# Patient Record
Sex: MALE
[De-identification: names, ages, dates, MRNs, and addresses within clinical notes are randomized per-mention and may not be internally consistent; named-entity substitution may affect disease eponyms.]

---

## 2020-12-15 ENCOUNTER — HOSPITAL ENCOUNTER (INPATIENT)
Dept: HOSPITAL 92 - ERS | Age: 35
LOS: 2 days | Discharge: HOME | DRG: 854 | End: 2020-12-17
Attending: INTERNAL MEDICINE | Admitting: FAMILY MEDICINE
Payer: SELF-PAY

## 2020-12-15 VITALS — BODY MASS INDEX: 23 KG/M2

## 2020-12-15 DIAGNOSIS — E11.628: ICD-10-CM

## 2020-12-15 DIAGNOSIS — E87.1: ICD-10-CM

## 2020-12-15 DIAGNOSIS — E11.65: ICD-10-CM

## 2020-12-15 DIAGNOSIS — L03.115: ICD-10-CM

## 2020-12-15 DIAGNOSIS — Z20.828: ICD-10-CM

## 2020-12-15 DIAGNOSIS — A41.9: Primary | ICD-10-CM

## 2020-12-15 LAB
ALBUMIN SERPL BCG-MCNC: 4 G/DL (ref 3.5–5)
ALP SERPL-CCNC: 206 U/L (ref 40–110)
ALT SERPL W P-5'-P-CCNC: 35 U/L (ref 8–55)
ANION GAP SERPL CALC-SCNC: 11 MMOL/L (ref 10–20)
ANION GAP SERPL CALC-SCNC: 17 MMOL/L (ref 10–20)
AST SERPL-CCNC: 26 U/L (ref 5–34)
BASOPHILS # BLD AUTO: 0.1 THOU/UL (ref 0–0.2)
BASOPHILS NFR BLD AUTO: 0.4 % (ref 0–1)
BICARBONATE (HCO3V): 21.7 MMOL/L (ref 22–28)
BILIRUB SERPL-MCNC: 0.3 MG/DL (ref 0.2–1.2)
BUN SERPL-MCNC: 25 MG/DL (ref 8.9–20.6)
BUN SERPL-MCNC: 30 MG/DL (ref 8.9–20.6)
CA-I BLD-SCNC: 0.94 MMOL/L (ref 1.15–1.33)
CALCIUM SERPL-MCNC: 8 MG/DL (ref 7.8–10.44)
CALCIUM SERPL-MCNC: 9.3 MG/DL (ref 7.8–10.44)
CHLORIDE SERPL-SCNC: 103 MMOL/L (ref 98–107)
CHLORIDE SERPL-SCNC: 94 MMOL/L (ref 98–107)
CHLORIDE SERPL-SCNC: 98 MMOL/L (ref 98–107)
CO2 BLDV CALC-SCNC: 22.7 MMOL/L (ref 22–28)
CO2 SERPL-SCNC: 22 MMOL/L (ref 22–29)
CO2 SERPL-SCNC: 23 MMOL/L (ref 22–29)
CO2 TENSION (PVCO2): 33.9 MMHG (ref 40–50)
CREAT CL PREDICTED SERPL C-G-VRATE: 0 ML/MIN (ref 70–130)
CREAT CL PREDICTED SERPL C-G-VRATE: 0 ML/MIN (ref 70–130)
DRUG SCREEN CUTOFF: (no result)
EOSINOPHIL # BLD AUTO: 0.1 THOU/UL (ref 0–0.7)
EOSINOPHIL NFR BLD AUTO: 0.7 % (ref 0–10)
GLOBULIN SER CALC-MCNC: 4.5 G/DL (ref 2.4–3.5)
GLUCOSE SERPL-MCNC: 414 MG/DL (ref 70–105)
GLUCOSE SERPL-MCNC: 647 MG/DL (ref 70–105)
HCT VFR BLD CALC: 46 % (ref 42–52)
HEMOGLOBIN - CALC: 15.5 G/DL (ref 14–18)
HGB BLD-MCNC: 14.9 G/DL (ref 14–18)
LYMPHOCYTES # BLD: 1.9 THOU/UL (ref 1.2–3.4)
LYMPHOCYTES NFR BLD AUTO: 15.5 % (ref 21–51)
MCH RBC QN AUTO: 28.1 PG (ref 27–31)
MCV RBC AUTO: 84.1 FL (ref 78–98)
MEDTOX CONTROL LINE VALID?: (no result)
MEDTOX READER #: (no result)
MONOCYTES # BLD AUTO: 0.7 THOU/UL (ref 0.11–0.59)
MONOCYTES NFR BLD AUTO: 5.8 % (ref 0–10)
NEUTROPHILS # BLD AUTO: 9.7 THOU/UL (ref 1.4–6.5)
NEUTROPHILS NFR BLD AUTO: 77.6 % (ref 42–75)
PLATELET # BLD AUTO: 294 THOU/UL (ref 130–400)
POTASSIUM SERPL-SCNC: 4.1 MMOL/L (ref 3.5–5.1)
POTASSIUM SERPL-SCNC: 4.5 MMOL/L (ref 3.5–5.1)
POTASSIUM SERPL-SCNC: 5.1 MMOL/L (ref 3.5–5.1)
RBC # BLD AUTO: 5.3 MILL/UL (ref 4.7–6.1)
RBC UR QL AUTO: (no result) HPF (ref 0–3)
SAO2 % BLDV FROM PO2: 84.2 % (ref 60–85)
SODIUM SERPL-SCNC: 129 MMOL/L (ref 136–145)
SODIUM SERPL-SCNC: 130 MMOL/L (ref 138–145)
SODIUM SERPL-SCNC: 132 MMOL/L (ref 136–145)
SP GR UR STRIP: 1.02 (ref 1–1.04)
WBC # BLD AUTO: 12.5 THOU/UL (ref 4.8–10.8)
WBC UR QL AUTO: (no result) HPF (ref 0–3)

## 2020-12-15 PROCEDURE — 81001 URINALYSIS AUTO W/SCOPE: CPT

## 2020-12-15 PROCEDURE — U0003 INFECTIOUS AGENT DETECTION BY NUCLEIC ACID (DNA OR RNA); SEVERE ACUTE RESPIRATORY SYNDROME CORONAVIRUS 2 (SARS-COV-2) (CORONAVIRUS DISEASE [COVID-19]), AMPLIFIED PROBE TECHNIQUE, MAKING USE OF HIGH THROUGHPUT TECHNOLOGIES AS DESCRIBED BY CMS-2020-01-R: HCPCS

## 2020-12-15 PROCEDURE — 83930 ASSAY OF BLOOD OSMOLALITY: CPT

## 2020-12-15 PROCEDURE — 36416 COLLJ CAPILLARY BLOOD SPEC: CPT

## 2020-12-15 PROCEDURE — 80048 BASIC METABOLIC PNL TOTAL CA: CPT

## 2020-12-15 PROCEDURE — 80306 DRUG TEST PRSMV INSTRMNT: CPT

## 2020-12-15 PROCEDURE — 96365 THER/PROPH/DIAG IV INF INIT: CPT

## 2020-12-15 PROCEDURE — 82010 KETONE BODYS QUAN: CPT

## 2020-12-15 PROCEDURE — 82330 ASSAY OF CALCIUM: CPT

## 2020-12-15 PROCEDURE — 87635 SARS-COV-2 COVID-19 AMP PRB: CPT

## 2020-12-15 PROCEDURE — 85025 COMPLETE CBC W/AUTO DIFF WBC: CPT

## 2020-12-15 PROCEDURE — G0378 HOSPITAL OBSERVATION PER HR: HCPCS

## 2020-12-15 PROCEDURE — 80053 COMPREHEN METABOLIC PANEL: CPT

## 2020-12-15 PROCEDURE — 36415 COLL VENOUS BLD VENIPUNCTURE: CPT

## 2020-12-15 PROCEDURE — 83036 HEMOGLOBIN GLYCOSYLATED A1C: CPT

## 2020-12-15 PROCEDURE — 85652 RBC SED RATE AUTOMATED: CPT

## 2020-12-15 PROCEDURE — 87070 CULTURE OTHR SPECIMN AEROBIC: CPT

## 2020-12-15 PROCEDURE — 96366 THER/PROPH/DIAG IV INF ADDON: CPT

## 2020-12-15 PROCEDURE — 10060 I&D ABSCESS SIMPLE/SINGLE: CPT

## 2020-12-15 PROCEDURE — 80202 ASSAY OF VANCOMYCIN: CPT

## 2020-12-15 PROCEDURE — 83605 ASSAY OF LACTIC ACID: CPT

## 2020-12-15 PROCEDURE — 87186 SC STD MICRODIL/AGAR DIL: CPT

## 2020-12-15 PROCEDURE — 96367 TX/PROPH/DG ADDL SEQ IV INF: CPT

## 2020-12-15 PROCEDURE — 87040 BLOOD CULTURE FOR BACTERIA: CPT

## 2020-12-15 PROCEDURE — 87205 SMEAR GRAM STAIN: CPT

## 2020-12-15 PROCEDURE — 87077 CULTURE AEROBIC IDENTIFY: CPT

## 2020-12-15 PROCEDURE — 96376 TX/PRO/DX INJ SAME DRUG ADON: CPT

## 2020-12-15 PROCEDURE — 82803 BLOOD GASES ANY COMBINATION: CPT

## 2020-12-15 PROCEDURE — 86140 C-REACTIVE PROTEIN: CPT

## 2020-12-15 NOTE — RAD
EXAM: 3 views of the right foot



HISTORY: Fourth digit wound. Diabetic patient



COMPARISON: None



FINDINGS: 3 views of the right foot shows no evidence of acute fracture or dislocation. Mild fourth t
oe soft tissue swelling is seen. No osseous erosions are seen. No degenerative changes are present.



IMPRESSION: No evidence of acute osseous abnormality.



Reported By: Ariel Polanco 

Electronically Signed:  12/15/2020 2:00 PM

## 2020-12-15 NOTE — PDOC.HHP
Hospitalist HPI





- History of Present Illness


Right foot redness


History of Present Illness: 





PCP: None





The patient is a 35-year-old male with a past medical history significant for 

diabetes type 2 on insulin that presents to the emergency department for the 

above complaint.  The patient reports increasing redness and swelling to the 

dorsal aspect of his right foot and fourth toe for the past 5 days.  He went to 

a clinic and was prescribed oral doxycycline and Flagyl, along with triple 

antibiotic ointment.  He reports he has been compliant with his regimen for the 

past 5 days.  However, the redness and swelling spread up the dorsal aspect of 

his right foot.  He reports mild, intermittent pain with ambulation, described 

as aching and throbbing.  He denies any known trauma/injury. Denies history IV 

drug usage. He denies any fever or chills.  He has no history of PAD and is 

immunocompetent.  Denies any history of DVT/PE.  Denies abdominal pain, nausea, 

vomiting, diarrhea.  For these reasons, the patient came to the emergency 

department for further evaluation.


ED Course: 





VITAL SIGNS Tue Dec 15, 2020 13:13 CARMEN Thornton, [facility] 


BP: 162/90, Pulse: 98, Resp: 16, Temp: 98.1 (Oral), Pain: 0, O2 sat: 99 on (Room

Air), Time: 12/15/2020 13:13. 


VITAL SIGNS Tue Dec 15, 2020 14:46 Natalia Darby 


BP: 123/67, Pulse: 95, O2 sat: 99 on (Room Air), Time: 12/15/2020 14:46. 


VITAL SIGNS Tue Dec 15, 2020 16:00 Natalia Darby 


BP: 121/72, Pulse: 90, Resp: 16, O2 sat: 99 on (Room Air), Time: 12/15/2020 

16:00.





Medications:


sodium chloride 0.9 % intravenous 2 L IV Fluid Infusion Acknowledged 18:00 

12/15/2020 


NovoLIN R Regular U-100 Insuln 7 units Subcutaneous Acknowledged 17:59 

12/15/2020 


vancomycin intravenous 1 g IV Piggy Back Given 17:37 12/15/2020 


cefepime injection 1 g IV Push Given 16:55 12/15/2020





Hospitalist ROS





- Review of Systems


Constitutional: denies: fever, chills


Respiratory: denies: shortness of breath, hemoptysis, SOB with excertion


Cardiovascular: denies: chest pain, palpitations, light headedness


Gastrointestinal: denies: nausea, vomiting, abdominal pain, diarrhea, 

constipation


Genitourinary: denies: dysuria, hematuria





Hospitalist History





- Past Medical History


Source: patient


Cardiac: reports: no pertinent history


Endocrine: reports: Diabetes





- Past Surgical History


Past Surgical History: reports: no pertinent history





- Family History


Other Family History: 





Noncontributory to this case





- Social History


Smoking Status: Never smoker


Alcohol: reports: None


Living Situation: With Family


Activity level: independent ambulation





- Exam


General Appearance: NAD, awake alert.  negative: ill appearing


Eye: anicteric sclera


ENT: normocephalic atraumatic


Heart: RRR, no murmur, no gallops, no rubs, normal peripheral pulses


Respiratory: CTAB, no wheezes, no rales, no ronchi, normal chest expansion, no 

tachypnea


Gastrointestinal: soft, non-tender, non-distended, normal bowel sounds, no 

guarding, no rigidity


Gastrointestinal - other findings: No rebound


Extremities: no cyanosis.  negative: no edema (Right foot has erythema around 

the fourth and fifth digit extending up the dorsal aspect to the midfoot, 

lateral aspect right fourth digit status post I&D with packing, palpable pedal 

pulse, full range of motion, sensation intact.  Erythema borders marked.  No 

pain outside of erythematous border.)


Neurological: no focal deficits


Musculoskeletal: normal tone, normal strength


Psychiatric: normal affect, A&O x 3





Hospitalist Results





- Labs


Result Diagrams: 


                                 12/15/20 14:04





                                 12/15/20 14:04


Lab results: 


                                        











WBC  12.5 thou/uL (4.8-10.8)  H  12/15/20  14:04    


 


Hgb  14.9 g/dL (14.0-18.0)   12/15/20  14:04    


 


Hct  44.6 % (42.0-52.0)   12/15/20  14:04    


 


MCV  84.1 fL (78.0-98.0)   12/15/20  14:04    


 


Plt Count  294 thou/uL (130-400)   12/15/20  14:04    


 


Neutrophils %  77.6 % (42.0-75.0)  H  12/15/20  14:04    


 


ESR Westergren  56 mm/hr (Less than 15)  H  12/15/20  14:04    


 


Sodium  129 mmol/L (136-145)  L  12/15/20  14:04    


 


Potassium  5.1 mmol/L (3.5-5.1)   12/15/20  14:04    


 


Chloride  94 mmol/L ()  L  12/15/20  14:04    


 


Carbon Dioxide  23 mmol/L (22-29)   12/15/20  14:04    


 


BUN  30 mg/dL (8.9-20.6)  H  12/15/20  14:04    


 


Creatinine  2.04 mg/dL (0.7-1.3)  H  12/15/20  14:04    


 


Glucose  647 mg/dL ()  H*  12/15/20  14:04    


 


Lactic Acid  2.8 mmol/L (0.5-2.2)  H  12/15/20  16:56    


 


Calcium  9.3 mg/dL (7.8-10.44)   12/15/20  14:04    


 


Total Bilirubin  0.3 mg/dL (0.2-1.2)   12/15/20  14:04    


 


AST  26 U/L (5-34)   12/15/20  14:04    


 


ALT  35 U/L (8-55)   12/15/20  14:04    


 


Alkaline Phosphatase  206 U/L ()  H  12/15/20  14:04    


 


C-Reactive Protein  6.72 mg/dL (= or < 0.5)  H  12/15/20  14:04    


 


Serum Total Protein  8.5 g/dL (6.0-8.3)  H  12/15/20  14:04    


 


Albumin  4.0 g/dL (3.5-5.0)   12/15/20  14:04    














- Radiology Interpretation


  ** Other


Status: report reviewed by me


Additional Comment: 





Xray Right foot





FINDINGS: 3 views of the right foot shows no evidence of acute fracture or 

dislocation. Mild fourth toe soft tissue swelling is seen. No osseous erosions 

are seen. No degenerative changes are present.





IMPRESSION: No evidence of acute osseous abnormality. 





Hospitalist H&P A/P





- Problem


(1) Cellulitis of right foot


Code(s): L03.115 - CELLULITIS OF RIGHT LOWER LIMB   Status: Acute   





(2) Sepsis


Code(s): A41.9 - SEPSIS, UNSPECIFIED ORGANISM   Status: Acute   





(3) Hyperglycemia


Code(s): R73.9 - HYPERGLYCEMIA, UNSPECIFIED   Status: Acute   





(4) Hyponatremia


Code(s): E87.1 - HYPO-OSMOLALITY AND HYPONATREMIA   Status: Acute   





(5) DM2 (diabetes mellitus, type 2)


Status: Chronic   





- Plan


Plan: 





35/M with PMH insulin-dependent diabetes presents for right foot cellulitis.  

Admit to medical floor, observation status.  Expected length of stay less than 2

midnights.





Presented hypertensive, tachycardic, NL RR, SPO2, afebrile.


Right foot XR negative for fracture and osteomyelitis.


LA 2.8, WBC 12.5 with left shift, ESR 56, CRP 6.72


, creatinine 2.04, BUN 30





#Cellulitis of right foot


With abscess, I&D by ERMD.


LRINEC score 5, low risk, no pain outside of erythematous border, no pain out of

proportion to injury.


Continue broad-spectrum antibiotics, vancomycin and cefepime.


Consult wound care therapy.


Order MRI to rule out osteomyelitis.


Blood cultures pending.


Reflex lactic acid pending





#Sepsis


without septic shock.


Likely related to problem #1


Blood culture pending.


Reflex lactic acid pending.


Received 1 L normal saline in ER.


Continue IV fluid resuscitation.


Continue broad-spectrum antibiotics cefepime and vancomycin.





#Hyperglycemia


Presented .


Give 1L bolus NS upon arrival to floor.


Will check serum osmolality, beta-hydroxybutryate and UA. No AMS.


Received 7 units of insulin are in ER.


Reports compliant with home insulin regimen.


Takes 10 units insulin R before breakfast.


Takes 10 units insulin R before dinner.


We will hold home dose of insulin for now.


Start Lantus 10 units at night.


Moderate ISS.


Accu-Cheks every 6 hours.


N.p.o. for now.


Recheck BMP at 2300.





#Hyponatremia


Presented serum sodium 128.


Corrected for hyperglycemia is Na 138


Continue glucose control and IVF.


Repeat labs in a.m.





#DM2


Insulin-dependent.


Takes regular insulin 10 units before breakfast and dinner daily.


We will check hemoglobin A1c.





Lovenox for DVT prophylaxis.


Pepcid for GI prophylaxis.


Full code.


Discussed case with Dr. Schofield.

## 2020-12-16 LAB
ANION GAP SERPL CALC-SCNC: 13 MMOL/L (ref 10–20)
BASOPHILS # BLD AUTO: 0 THOU/UL (ref 0–0.2)
BASOPHILS NFR BLD AUTO: 0.3 % (ref 0–1)
BUN SERPL-MCNC: 24 MG/DL (ref 8.9–20.6)
CALCIUM SERPL-MCNC: 8 MG/DL (ref 7.8–10.44)
CHLORIDE SERPL-SCNC: 108 MMOL/L (ref 98–107)
CO2 SERPL-SCNC: 17 MMOL/L (ref 22–29)
CREAT CL PREDICTED SERPL C-G-VRATE: 68 ML/MIN (ref 70–130)
EOSINOPHIL # BLD AUTO: 0.2 THOU/UL (ref 0–0.7)
EOSINOPHIL NFR BLD AUTO: 1.7 % (ref 0–10)
GLUCOSE SERPL-MCNC: 325 MG/DL (ref 70–105)
HGB BLD-MCNC: 12.5 G/DL (ref 14–18)
LYMPHOCYTES # BLD: 2.6 THOU/UL (ref 1.2–3.4)
LYMPHOCYTES NFR BLD AUTO: 21.7 % (ref 21–51)
MCH RBC QN AUTO: 27.9 PG (ref 27–31)
MCV RBC AUTO: 83.8 FL (ref 78–98)
MONOCYTES # BLD AUTO: 1 THOU/UL (ref 0.11–0.59)
MONOCYTES NFR BLD AUTO: 8.6 % (ref 0–10)
NEUTROPHILS # BLD AUTO: 8.1 THOU/UL (ref 1.4–6.5)
NEUTROPHILS NFR BLD AUTO: 67.7 % (ref 42–75)
PLATELET # BLD AUTO: 265 THOU/UL (ref 130–400)
POTASSIUM SERPL-SCNC: 4.3 MMOL/L (ref 3.5–5.1)
RBC # BLD AUTO: 4.47 MILL/UL (ref 4.7–6.1)
SODIUM SERPL-SCNC: 134 MMOL/L (ref 136–145)
WBC # BLD AUTO: 12 THOU/UL (ref 4.8–10.8)

## 2020-12-16 PROCEDURE — 0J9R0ZZ DRAINAGE OF LEFT FOOT SUBCUTANEOUS TISSUE AND FASCIA, OPEN APPROACH: ICD-10-PCS | Performed by: INTERNAL MEDICINE

## 2020-12-16 RX ADMIN — INSULIN LISPRO PRN UNIT: 100 INJECTION, SOLUTION INTRAVENOUS; SUBCUTANEOUS at 13:14

## 2020-12-16 RX ADMIN — INSULIN LISPRO PRN UNIT: 100 INJECTION, SOLUTION INTRAVENOUS; SUBCUTANEOUS at 05:37

## 2020-12-16 RX ADMIN — INSULIN LISPRO PRN UNIT: 100 INJECTION, SOLUTION INTRAVENOUS; SUBCUTANEOUS at 17:02

## 2020-12-16 NOTE — MRI
EXAM:  MRI Lower Ext Jt Rt WO Con



DATE:  12/16/2020 10:56 AM



INDICATION:  Rule out osteomyelitis; nonhealing ulcer at the fourth digit with history of diabetes



COMPARISON:  Right foot radiograph dated December 15, 2020



FINDING:  There is a 1 x 0.8 cm ulceration involving the plantar and lateral aspect of the soft tissu
es at the base of the fourth digit MTP joint. This is best seen on image 16 of series 4, image 16

of series 3 and image 7 of series 6. No definite abnormal marrow signal is seen involving the bones o
f the fourth digit to suggest presence of osteomyelitis. Mild degenerative changes are seen within

the midfoot. There is diffuse edema involving the soft tissues of the foot as well as the intrinsic f
oot musculature. Visualized flexor and extensor tendons appear within normal limits.





IMPRESSION:

1. Soft tissue ulceration along the plantar lateral aspect of the fourth digit MTP joint correspondin
g patient's known soft tissue wound. No additional drainable fluid collection is evident. There is

no overt evidence of osteomyelitis.

2. Extensive edema involving the intrinsic foot musculature of the right foot in addition to the soft
 tissues. Findings can be seen with lymphedema and acute denervation; however, a myositis and

cellulitis cannot be excluded.



Reported By: Rajeev Hidalgo 

Electronically Signed:  12/16/2020 11:49 AM

## 2020-12-17 VITALS — TEMPERATURE: 96.8 F

## 2020-12-17 VITALS — SYSTOLIC BLOOD PRESSURE: 145 MMHG | DIASTOLIC BLOOD PRESSURE: 79 MMHG

## 2020-12-17 LAB — VANCOMYCIN TROUGH SERPL-MCNC: 8.2 UG/ML

## 2020-12-17 RX ADMIN — INSULIN LISPRO PRN UNIT: 100 INJECTION, SOLUTION INTRAVENOUS; SUBCUTANEOUS at 11:56

## 2020-12-17 RX ADMIN — INSULIN LISPRO PRN UNIT: 100 INJECTION, SOLUTION INTRAVENOUS; SUBCUTANEOUS at 06:09

## 2020-12-17 RX ADMIN — INSULIN LISPRO PRN UNIT: 100 INJECTION, SOLUTION INTRAVENOUS; SUBCUTANEOUS at 16:17

## 2020-12-17 NOTE — PDOC.DS.DS
Provider





- Provider


Date of Admission: 


12/16/20 14:55





Date of Discharge: 12/17/20


Admitting Provider: 


Colten Schofield DO





Primary Care Physician: 


NO PCP PROVIDER








Course





- Hospital Course


Hospital Course: 


The patient is a 35-year-old male with past medical history of diabetes mellitus

type 2 who was admitted to the hospital due to redness, swelling, and tenderness

of his right foot.  He was diagnosed with cellulitis on the outpatient basis 

but-year-old outpatient antibiotic therapy.  MRI of the right foot did not 

reveal any osteomyelitis or abscess.  He was started on empiric antibiotics and 

his condition improved quickly.  He will be discharged on a course of clinda

mycin.


Resuscitation Status: 








12/15/20 18:51


Resuscitation Status Routine 


   Co-Sign Provider: 


   Resuscitation Status: FULL: Full Resuscitation


   Discussed with: patient











- Labs


Lab Results: 


                                        





                                 12/16/20 05:01 





                                 12/16/20 05:01 





Abnormal Lab Results - Last 48 hrs





12/15/20 16:56: Lactic Acid 2.8 H


12/15/20 18:08: Serum Osmolality 306 H


12/15/20 18:08: B-Hydroxybutyrate 0.61 H


12/15/20 18:12: POC Bicarbonate Calc 21.7 L, VBG pCO2 33.9 L, VBG pO2 47.8 H, 

POC Venous Sodium 130 L, POC Venous Ion Calcium 0.94 L


12/15/20 20:30: Urine Glucose (UA) Greater than 1000 A, Urine Blood 1+ A


12/15/20 22:52: Sodium 132 L, BUN 25 H, Creatinine 1.47 H


12/16/20 05:01: Sodium 134 L, Chloride 108 H, Carbon Dioxide 17 L, BUN 24 H


12/16/20 05:01: WBC 12.0 H, RBC 4.47 L, Hgb 12.5 L, Hct 37.5 L, RDW 10.9 L, 

Neutrophils # 8.1 H, Monocytes # 1.0 H


12/16/20 05:01: Hemoglobin A1c Greater than  14.0 H





Microbiology - Entire Visit





12/15/20 16:56   Venous blood - Right Arm   Blood Culture - Preliminary


                            NO GROWTH AT 48 HOURS


12/15/20 16:54   Venous blood - Left Arm   Blood Culture - Preliminary


                            NO GROWTH AT 48 HOURS


12/15/20 14:15   Foot - Pending   Bacterial Culture - Preliminary


                            Staphylococcus species











- Physical Exam


Vitals: 


                             Vital Signs (12 hours)











  Temp Pulse Resp BP Pulse Ox


 


 12/17/20 08:15      98


 


 12/17/20 07:40  98.3 F  81  18  148/81 H  98








                                     Weight











Admit Weight                   130 lb


 


Weight                         130 lb 1.164 oz














Physical Exam: 


The patient was seen and examined on the day of discharge.








Plan





- Discharge Medications


Prescriptions: 


Clindamycin [Cleocin] 300 mg PO Q8HR #42 cap


Home Medications: 


                                        











 Medication  Instructions  Recorded  Confirmed  Type


 


Neomycn/Bacitrc/Polymyx/Pramox 1 applic TOP BID 12/16/20 12/16/20 History





[Triple Antibiotic Plus Ointmnt]    


 


Clindamycin [Cleocin] 300 mg PO Q8HR #42 cap 12/17/20  Rx











Allergies: 








No Known Drug Allergies Allergy (Verified 12/16/20 01:31)


   








- Follow up Plan


Referrals: 


PROVIDER,NO PCP [Primary Care Provider] - 


Disposition: HOME





Quality





- Care Measures


CORE MEASURES:: N/A

## 2020-12-18 NOTE — PQF
CLINICAL DOCUMENTATION CLARIFICATION FORM:



Dear : Mundo Hanna                            Date / Time: 12/18/2020

Please exercise your independent, professional judgment in responding to the 
clarification form. 

Clinical indicators are provided on the bottom of this form for your review





Kindly clarify regarding depth of I and D



Please check appropriate box(es):

[ >] Incision and Drainage:  

         Depth:   [  ] Skin    [ > ] Subcutaneous     [  ] Fascia      [  ] 
Muscle     [  ] Tendon     [  ] Bone

[  ] Other procedure diagnosis ___________ 

[  ] Unable to determine









To be completed by CDI/Coding staff for physician review: 







 Present Clinical Indicators - Signs / Symptoms / Labs Results and Location in 

Medical Record

 

[ x ] Incision and drainage indicated for cutaneous abscess.  Incision was made 
over area of fluctuance, drained pus, amount 5 mLs.  Lateral incision made along
the lateral aspect of the fourth digit  ED visit notes 12/16

 

[ x ] Cellulitis of right foot with abscess, I and D by ER MD.  Consult wound 
care therapy  H and P

 

Present Risk Factors                                                            
             Results and Location in Medical Record

 

[ x ] Right foot cellulitis with abscess, sepsis due to right foot cellulitis 
and abscess H and P

 

Present Treatments                                                              
               Results and Location in Medical Record

 

  

 

  

 

  

 

  





CDS/ Signature: [id number]                  Phone #: _____________        
Date/Time: 12/18/2020



                 This is a permanent part of the Medical Record

[facility]

## 2023-03-06 ENCOUNTER — HOSPITAL ENCOUNTER (INPATIENT)
Dept: HOSPITAL 92 - ERS | Age: 38
LOS: 8 days | Discharge: ANOTHER HOSPITAL | DRG: 682 | End: 2023-03-14
Attending: INTERNAL MEDICINE | Admitting: STUDENT IN AN ORGANIZED HEALTH CARE EDUCATION/TRAINING PROGRAM
Payer: SELF-PAY

## 2023-03-06 VITALS — BODY MASS INDEX: 27.4 KG/M2

## 2023-03-06 DIAGNOSIS — J90: ICD-10-CM

## 2023-03-06 DIAGNOSIS — G40.901: ICD-10-CM

## 2023-03-06 DIAGNOSIS — G93.1: ICD-10-CM

## 2023-03-06 DIAGNOSIS — R00.1: ICD-10-CM

## 2023-03-06 DIAGNOSIS — I46.8: ICD-10-CM

## 2023-03-06 DIAGNOSIS — E10.21: ICD-10-CM

## 2023-03-06 DIAGNOSIS — E10.22: ICD-10-CM

## 2023-03-06 DIAGNOSIS — E10.649: ICD-10-CM

## 2023-03-06 DIAGNOSIS — E87.5: ICD-10-CM

## 2023-03-06 DIAGNOSIS — I50.30: ICD-10-CM

## 2023-03-06 DIAGNOSIS — D63.1: ICD-10-CM

## 2023-03-06 DIAGNOSIS — Z20.822: ICD-10-CM

## 2023-03-06 DIAGNOSIS — N17.9: Primary | ICD-10-CM

## 2023-03-06 DIAGNOSIS — I16.0: ICD-10-CM

## 2023-03-06 DIAGNOSIS — N18.30: ICD-10-CM

## 2023-03-06 DIAGNOSIS — I13.0: ICD-10-CM

## 2023-03-06 DIAGNOSIS — J18.9: ICD-10-CM

## 2023-03-06 DIAGNOSIS — J96.01: ICD-10-CM

## 2023-03-06 DIAGNOSIS — E87.20: ICD-10-CM

## 2023-03-06 DIAGNOSIS — Z79.899: ICD-10-CM

## 2023-03-06 LAB
ALBUMIN SERPL BCG-MCNC: 3.6 G/DL (ref 3.5–5)
ALP SERPL-CCNC: 333 U/L (ref 40–110)
ALT SERPL W P-5'-P-CCNC: 48 U/L (ref 8–55)
ANION GAP SERPL CALC-SCNC: 13 MMOL/L (ref 10–20)
AST SERPL-CCNC: 48 U/L (ref 5–34)
BASOPHILS # BLD AUTO: 0 THOU/UL (ref 0–0.2)
BASOPHILS NFR BLD AUTO: 0.3 % (ref 0–1)
BILIRUB SERPL-MCNC: 0.2 MG/DL (ref 0.2–1.2)
BUN SERPL-MCNC: 58 MG/DL (ref 8.9–20.6)
CALCIUM SERPL-MCNC: 8.5 MG/DL (ref 7.8–10.44)
CHLORIDE SERPL-SCNC: 112 MMOL/L (ref 98–107)
CK SERPL-CCNC: 131 U/L (ref 30–200)
CO2 SERPL-SCNC: 18 MMOL/L (ref 22–29)
CREAT CL PREDICTED SERPL C-G-VRATE: 0 ML/MIN (ref 70–130)
EOSINOPHIL # BLD AUTO: 0.3 THOU/UL (ref 0–0.7)
EOSINOPHIL NFR BLD AUTO: 2.8 % (ref 0–10)
GLOBULIN SER CALC-MCNC: 3.3 G/DL (ref 2.4–3.5)
GLUCOSE SERPL-MCNC: 52 MG/DL (ref 70–105)
HGB BLD-MCNC: 8.6 G/DL (ref 14–18)
LYMPHOCYTES # BLD: 2.2 THOU/UL (ref 1.2–3.4)
LYMPHOCYTES NFR BLD AUTO: 21.4 % (ref 21–51)
MCH RBC QN AUTO: 27.5 PG (ref 27–31)
MCV RBC AUTO: 85.1 FL (ref 78–98)
MONOCYTES # BLD AUTO: 0.8 THOU/UL (ref 0.11–0.59)
MONOCYTES NFR BLD AUTO: 7.8 % (ref 0–10)
NEUTROPHILS # BLD AUTO: 7 THOU/UL (ref 1.4–6.5)
NEUTROPHILS NFR BLD AUTO: 67.8 % (ref 42–75)
PLATELET # BLD AUTO: 218 10X3/UL (ref 130–400)
POTASSIUM SERPL-SCNC: 5.4 MMOL/L (ref 3.5–5.1)
RBC # BLD AUTO: 3.11 MILL/UL (ref 4.7–6.1)
SODIUM SERPL-SCNC: 138 MMOL/L (ref 136–145)
WBC # BLD AUTO: 10.3 10X3/UL (ref 4.8–10.8)

## 2023-03-06 PROCEDURE — 84300 ASSAY OF URINE SODIUM: CPT

## 2023-03-06 PROCEDURE — 84484 ASSAY OF TROPONIN QUANT: CPT

## 2023-03-06 PROCEDURE — 71045 X-RAY EXAM CHEST 1 VIEW: CPT

## 2023-03-06 PROCEDURE — 84165 PROTEIN E-PHORESIS SERUM: CPT

## 2023-03-06 PROCEDURE — 83880 ASSAY OF NATRIURETIC PEPTIDE: CPT

## 2023-03-06 PROCEDURE — 87205 SMEAR GRAM STAIN: CPT

## 2023-03-06 PROCEDURE — 82570 ASSAY OF URINE CREATININE: CPT

## 2023-03-06 PROCEDURE — 81015 MICROSCOPIC EXAM OF URINE: CPT

## 2023-03-06 PROCEDURE — 83036 HEMOGLOBIN GLYCOSYLATED A1C: CPT

## 2023-03-06 PROCEDURE — 87491 CHLMYD TRACH DNA AMP PROBE: CPT

## 2023-03-06 PROCEDURE — 95957 EEG DIGITAL ANALYSIS: CPT

## 2023-03-06 PROCEDURE — 85652 RBC SED RATE AUTOMATED: CPT

## 2023-03-06 PROCEDURE — 93306 TTE W/DOPPLER COMPLETE: CPT

## 2023-03-06 PROCEDURE — 84156 ASSAY OF PROTEIN URINE: CPT

## 2023-03-06 PROCEDURE — 84166 PROTEIN E-PHORESIS/URINE/CSF: CPT

## 2023-03-06 PROCEDURE — 36600 WITHDRAWAL OF ARTERIAL BLOOD: CPT

## 2023-03-06 PROCEDURE — 86140 C-REACTIVE PROTEIN: CPT

## 2023-03-06 PROCEDURE — 83935 ASSAY OF URINE OSMOLALITY: CPT

## 2023-03-06 PROCEDURE — 95819 EEG AWAKE AND ASLEEP: CPT

## 2023-03-06 PROCEDURE — 86038 ANTINUCLEAR ANTIBODIES: CPT

## 2023-03-06 PROCEDURE — 80069 RENAL FUNCTION PANEL: CPT

## 2023-03-06 PROCEDURE — 81001 URINALYSIS AUTO W/SCOPE: CPT

## 2023-03-06 PROCEDURE — 93010 ELECTROCARDIOGRAM REPORT: CPT

## 2023-03-06 PROCEDURE — 80053 COMPREHEN METABOLIC PANEL: CPT

## 2023-03-06 PROCEDURE — A4217 STERILE WATER/SALINE, 500 ML: HCPCS

## 2023-03-06 PROCEDURE — 95816 EEG AWAKE AND DROWSY: CPT

## 2023-03-06 PROCEDURE — 87389 HIV-1 AG W/HIV-1&-2 AB AG IA: CPT

## 2023-03-06 PROCEDURE — 87255 GENET VIRUS ISOLATE HSV: CPT

## 2023-03-06 PROCEDURE — 80048 BASIC METABOLIC PNL TOTAL CA: CPT

## 2023-03-06 PROCEDURE — 94003 VENT MGMT INPAT SUBQ DAY: CPT

## 2023-03-06 PROCEDURE — 82805 BLOOD GASES W/O2 SATURATION: CPT

## 2023-03-06 PROCEDURE — 86160 COMPLEMENT ANTIGEN: CPT

## 2023-03-06 PROCEDURE — 71250 CT THORAX DX C-: CPT

## 2023-03-06 PROCEDURE — 87040 BLOOD CULTURE FOR BACTERIA: CPT

## 2023-03-06 PROCEDURE — C9113 INJ PANTOPRAZOLE SODIUM, VIA: HCPCS

## 2023-03-06 PROCEDURE — 80074 ACUTE HEPATITIS PANEL: CPT

## 2023-03-06 PROCEDURE — 76770 US EXAM ABDO BACK WALL COMP: CPT

## 2023-03-06 PROCEDURE — 96374 THER/PROPH/DIAG INJ IV PUSH: CPT

## 2023-03-06 PROCEDURE — 87811 SARS-COV-2 COVID19 W/OPTIC: CPT

## 2023-03-06 PROCEDURE — 93970 EXTREMITY STUDY: CPT

## 2023-03-06 PROCEDURE — 80306 DRUG TEST PRSMV INSTRMNT: CPT

## 2023-03-06 PROCEDURE — 82728 ASSAY OF FERRITIN: CPT

## 2023-03-06 PROCEDURE — 83605 ASSAY OF LACTIC ACID: CPT

## 2023-03-06 PROCEDURE — C9254 INJECTION, LACOSAMIDE: HCPCS

## 2023-03-06 PROCEDURE — 93005 ELECTROCARDIOGRAM TRACING: CPT

## 2023-03-06 PROCEDURE — 36416 COLLJ CAPILLARY BLOOD SPEC: CPT

## 2023-03-06 PROCEDURE — 93976 VASCULAR STUDY: CPT

## 2023-03-06 PROCEDURE — 84155 ASSAY OF PROTEIN SERUM: CPT

## 2023-03-06 PROCEDURE — 83550 IRON BINDING TEST: CPT

## 2023-03-06 PROCEDURE — 87591 N.GONORRHOEAE DNA AMP PROB: CPT

## 2023-03-06 PROCEDURE — 36415 COLL VENOUS BLD VENIPUNCTURE: CPT

## 2023-03-06 PROCEDURE — P9047 ALBUMIN (HUMAN), 25%, 50ML: HCPCS

## 2023-03-06 PROCEDURE — 82010 KETONE BODYS QUAN: CPT

## 2023-03-06 PROCEDURE — 85730 THROMBOPLASTIN TIME PARTIAL: CPT

## 2023-03-06 PROCEDURE — 86901 BLOOD TYPING SEROLOGIC RH(D): CPT

## 2023-03-06 PROCEDURE — 76705 ECHO EXAM OF ABDOMEN: CPT

## 2023-03-06 PROCEDURE — U0002 COVID-19 LAB TEST NON-CDC: HCPCS

## 2023-03-06 PROCEDURE — 86900 BLOOD TYPING SEROLOGIC ABO: CPT

## 2023-03-06 PROCEDURE — P9016 RBC LEUKOCYTES REDUCED: HCPCS

## 2023-03-06 PROCEDURE — 84145 PROCALCITONIN (PCT): CPT

## 2023-03-06 PROCEDURE — 94002 VENT MGMT INPAT INIT DAY: CPT

## 2023-03-06 PROCEDURE — 86850 RBC ANTIBODY SCREEN: CPT

## 2023-03-06 PROCEDURE — 74177 CT ABD & PELVIS W/CONTRAST: CPT

## 2023-03-06 PROCEDURE — 86225 DNA ANTIBODY NATIVE: CPT

## 2023-03-06 PROCEDURE — 85384 FIBRINOGEN ACTIVITY: CPT

## 2023-03-06 PROCEDURE — 85610 PROTHROMBIN TIME: CPT

## 2023-03-06 PROCEDURE — 82550 ASSAY OF CK (CPK): CPT

## 2023-03-06 PROCEDURE — 85025 COMPLETE CBC W/AUTO DIFF WBC: CPT

## 2023-03-06 PROCEDURE — 83540 ASSAY OF IRON: CPT

## 2023-03-06 PROCEDURE — 83970 ASSAY OF PARATHORMONE: CPT

## 2023-03-06 PROCEDURE — 36430 TRANSFUSION BLD/BLD COMPNT: CPT

## 2023-03-06 PROCEDURE — 70450 CT HEAD/BRAIN W/O DYE: CPT

## 2023-03-06 RX ADMIN — CEFTRIAXONE SCH MLS: 1 INJECTION, POWDER, FOR SOLUTION INTRAMUSCULAR; INTRAVENOUS at 23:25

## 2023-03-06 RX ADMIN — HEPARIN SODIUM SCH UNITS: 5000 INJECTION, SOLUTION INTRAVENOUS; SUBCUTANEOUS at 23:11

## 2023-03-07 LAB
ANION GAP SERPL CALC-SCNC: 15 MMOL/L (ref 10–20)
BASOPHILS # BLD AUTO: 0 THOU/UL (ref 0–0.2)
BASOPHILS NFR BLD AUTO: 0.3 % (ref 0–1)
BUN SERPL-MCNC: 58 MG/DL (ref 8.9–20.6)
CALCIUM SERPL-MCNC: 8.2 MG/DL (ref 7.8–10.44)
CHLORIDE SERPL-SCNC: 111 MMOL/L (ref 98–107)
CO2 SERPL-SCNC: 16 MMOL/L (ref 22–29)
CREAT CL PREDICTED SERPL C-G-VRATE: 31 ML/MIN (ref 70–130)
EOSINOPHIL # BLD AUTO: 0.3 THOU/UL (ref 0–0.7)
EOSINOPHIL NFR BLD AUTO: 3.5 % (ref 0–10)
GLUCOSE SERPL-MCNC: 170 MG/DL (ref 70–105)
GLUCOSE UR STRIP-MCNC: 70 MG/DL
HBCM INDEX: 0.1 S/CO (ref 0–0.79)
HBSAG INDEX: 0.2 S/CO (ref 0–0.99)
HEP A IGM S/CO: 0.44 S/CO (ref 0–0.79)
HEP C INDEX: 0.07 S/CO (ref 0–0.79)
HGB BLD-MCNC: 7.3 G/DL (ref 14–18)
HIV 1/2 INDEX: 0.33 S/CO (ref ?–1)
LYMPHOCYTES # BLD: 1.7 THOU/UL (ref 1.2–3.4)
LYMPHOCYTES NFR BLD AUTO: 19.1 % (ref 21–51)
MCH RBC QN AUTO: 27.7 PG (ref 27–31)
MCV RBC AUTO: 85.3 FL (ref 78–98)
MONOCYTES # BLD AUTO: 0.7 THOU/UL (ref 0.11–0.59)
MONOCYTES NFR BLD AUTO: 8.1 % (ref 0–10)
NEUTROPHILS # BLD AUTO: 6.1 THOU/UL (ref 1.4–6.5)
NEUTROPHILS NFR BLD AUTO: 69.1 % (ref 42–75)
PLATELET # BLD AUTO: 198 10X3/UL (ref 130–400)
POTASSIUM SERPL-SCNC: 4.9 MMOL/L (ref 3.5–5.1)
PROT UR STRIP.AUTO-MCNC: 100 MG/DL
PROT UR-MCNC: 180 MG/DL (ref 1–14)
RBC # BLD AUTO: 2.64 MILL/UL (ref 4.7–6.1)
RBC UR QL AUTO: (no result) HPF (ref 0–3)
SODIUM SERPL-SCNC: 137 MMOL/L (ref 136–145)
SP GR UR STRIP: 1.01 (ref 1–1.04)
WBC # BLD AUTO: 8.8 10X3/UL (ref 4.8–10.8)
WBC UR QL AUTO: (no result) HPF (ref 0–3)

## 2023-03-07 RX ADMIN — INSULIN LISPRO PRN UNIT: 100 INJECTION, SOLUTION INTRAVENOUS; SUBCUTANEOUS at 14:13

## 2023-03-07 RX ADMIN — HEPARIN SODIUM SCH UNITS: 5000 INJECTION, SOLUTION INTRAVENOUS; SUBCUTANEOUS at 10:55

## 2023-03-07 RX ADMIN — CEFTRIAXONE SCH MLS: 1 INJECTION, POWDER, FOR SOLUTION INTRAMUSCULAR; INTRAVENOUS at 21:16

## 2023-03-07 RX ADMIN — HEPARIN SODIUM SCH UNITS: 5000 INJECTION, SOLUTION INTRAVENOUS; SUBCUTANEOUS at 18:20

## 2023-03-07 RX ADMIN — HEPARIN SODIUM SCH UNITS: 5000 INJECTION, SOLUTION INTRAVENOUS; SUBCUTANEOUS at 21:17

## 2023-03-08 LAB
ALBUMIN SERPL BCG-MCNC: 3 G/DL (ref 3.5–5)
ANION GAP SERPL CALC-SCNC: 15 MMOL/L (ref 10–20)
ANION GAP SERPL CALC-SCNC: 16 MMOL/L (ref 10–20)
BASOPHILS # BLD AUTO: 0 THOU/UL (ref 0–0.2)
BASOPHILS NFR BLD AUTO: 0.4 % (ref 0–1)
BUN SERPL-MCNC: 57 MG/DL (ref 8.9–20.6)
BUN SERPL-MCNC: 57 MG/DL (ref 8.9–20.6)
BUN/CREAT SERPL: 16.86
CALCIUM SERPL-MCNC: 8.3 MG/DL (ref 7.8–10.44)
CALCIUM SERPL-MCNC: 8.4 MG/DL (ref 7.8–10.44)
CHLORIDE SERPL-SCNC: 107 MMOL/L (ref 98–107)
CHLORIDE SERPL-SCNC: 112 MMOL/L (ref 98–107)
CO2 SERPL-SCNC: 15 MMOL/L (ref 22–29)
CO2 SERPL-SCNC: 19 MMOL/L (ref 22–29)
CREAT 24H UR-MRATE: 1116.15 MG/24 HR (ref 950–2490)
CREAT CL PREDICTED SERPL C-G-VRATE: 30 ML/MIN (ref 70–130)
CREAT CL PREDICTED SERPL C-G-VRATE: 32 ML/MIN (ref 70–130)
EOSINOPHIL # BLD AUTO: 0.3 THOU/UL (ref 0–0.7)
EOSINOPHIL NFR BLD AUTO: 3.2 % (ref 0–10)
GLUCOSE SERPL-MCNC: 144 MG/DL (ref 70–105)
GLUCOSE SERPL-MCNC: 223 MG/DL (ref 70–105)
HGB BLD-MCNC: 7.9 G/DL (ref 14–18)
IRON SERPL-MCNC: 49 UG/DL (ref 65–175)
LYMPHOCYTES # BLD: 1.8 THOU/UL (ref 1.2–3.4)
LYMPHOCYTES NFR BLD AUTO: 19.1 % (ref 21–51)
MCH RBC QN AUTO: 27.8 PG (ref 27–31)
MCV RBC AUTO: 85.4 FL (ref 78–98)
MONOCYTES # BLD AUTO: 0.7 THOU/UL (ref 0.11–0.59)
MONOCYTES NFR BLD AUTO: 7.1 % (ref 0–10)
NEUTROPHILS # BLD AUTO: 6.6 THOU/UL (ref 1.4–6.5)
NEUTROPHILS NFR BLD AUTO: 70.3 % (ref 42–75)
PLATELET # BLD AUTO: 205 10X3/UL (ref 130–400)
POTASSIUM SERPL-SCNC: 5 MMOL/L (ref 3.5–5.1)
POTASSIUM SERPL-SCNC: 5.3 MMOL/L (ref 3.5–5.1)
PROT UR-MCNC: 182 MG/DL (ref 1–14)
RBC # BLD AUTO: 2.84 MILL/UL (ref 4.7–6.1)
SODIUM SERPL-SCNC: 137 MMOL/L (ref 136–145)
SODIUM SERPL-SCNC: 137 MMOL/L (ref 136–145)
SPECIMEN VOL UR: 1750 ML (ref 800–1800)
UIBC SERPL-MCNC: 230 MCG/DL (ref 261–462)
WBC # BLD AUTO: 9.3 10X3/UL (ref 4.8–10.8)

## 2023-03-08 RX ADMIN — SODIUM BICARBONATE SCH MG: 325 TABLET ORAL at 20:22

## 2023-03-08 RX ADMIN — HEPARIN SODIUM SCH UNITS: 5000 INJECTION, SOLUTION INTRAVENOUS; SUBCUTANEOUS at 16:46

## 2023-03-08 RX ADMIN — CEFTRIAXONE SCH MLS: 1 INJECTION, POWDER, FOR SOLUTION INTRAMUSCULAR; INTRAVENOUS at 20:21

## 2023-03-08 RX ADMIN — ALBUMIN HUMAN SCH GM: 250 SOLUTION INTRAVENOUS at 23:50

## 2023-03-08 RX ADMIN — ALBUMIN HUMAN SCH GM: 250 SOLUTION INTRAVENOUS at 12:23

## 2023-03-08 RX ADMIN — HEPARIN SODIUM SCH UNITS: 5000 INJECTION, SOLUTION INTRAVENOUS; SUBCUTANEOUS at 20:22

## 2023-03-08 RX ADMIN — SODIUM BICARBONATE SCH MG: 325 TABLET ORAL at 16:45

## 2023-03-08 RX ADMIN — SODIUM BICARBONATE SCH MG: 325 TABLET ORAL at 10:52

## 2023-03-08 RX ADMIN — ALBUMIN HUMAN SCH GM: 250 SOLUTION INTRAVENOUS at 17:52

## 2023-03-08 RX ADMIN — HEPARIN SODIUM SCH UNITS: 5000 INJECTION, SOLUTION INTRAVENOUS; SUBCUTANEOUS at 10:53

## 2023-03-09 LAB
ALBUMIN SERPL BCG-MCNC: 3.5 G/DL (ref 3.5–5)
ALBUMIN SERPL BCG-MCNC: 3.8 G/DL (ref 3.5–5)
ALP SERPL-CCNC: 467 U/L (ref 40–110)
ALT SERPL W P-5'-P-CCNC: 364 U/L (ref 8–55)
ANA SYMPHONY (QUANTITATIVE): 0.3 RATIO
ANALYZER IN CARDIO: (no result)
ANION GAP SERPL CALC-SCNC: 14 MMOL/L (ref 10–20)
ANION GAP SERPL CALC-SCNC: 17 MMOL/L (ref 10–20)
ANION GAP SERPL CALC-SCNC: 18 MMOL/L (ref 10–20)
APTT PPP: 27.2 SEC (ref 22.9–36.1)
APTT PPP: 27.4 SEC (ref 22.9–36.1)
AST SERPL-CCNC: 516 U/L (ref 5–34)
BACTERIA UR QL AUTO: (no result) HPF
BASE EXCESS STD BLDA CALC-SCNC: -1.6 MEQ/L
BASOPHILS # BLD AUTO: 0 THOU/UL (ref 0–0.2)
BASOPHILS NFR BLD AUTO: 0.1 % (ref 0–1)
BASOPHILS NFR BLD AUTO: 0.2 % (ref 0–1)
BASOPHILS NFR BLD AUTO: 0.5 % (ref 0–1)
BILIRUB SERPL-MCNC: 0.3 MG/DL (ref 0.2–1.2)
BUN SERPL-MCNC: 56 MG/DL (ref 8.9–20.6)
BUN SERPL-MCNC: 57 MG/DL (ref 8.9–20.6)
BUN SERPL-MCNC: 59 MG/DL (ref 8.9–20.6)
BUN/CREAT SERPL: 16.92
CA-I BLDA-SCNC: 1.38 MMOL/L (ref 1.12–1.3)
CALCIUM SERPL-MCNC: 10.7 MG/DL (ref 7.8–10.44)
CALCIUM SERPL-MCNC: 8.1 MG/DL (ref 7.8–10.44)
CALCIUM SERPL-MCNC: 9 MG/DL (ref 7.8–10.44)
CHLORIDE SERPL-SCNC: 105 MMOL/L (ref 98–107)
CHLORIDE SERPL-SCNC: 106 MMOL/L (ref 98–107)
CHLORIDE SERPL-SCNC: 106 MMOL/L (ref 98–107)
CO2 SERPL-SCNC: 20 MMOL/L (ref 22–29)
CO2 SERPL-SCNC: 21 MMOL/L (ref 22–29)
CO2 SERPL-SCNC: 21 MMOL/L (ref 22–29)
CREAT CL PREDICTED SERPL C-G-VRATE: 27 ML/MIN (ref 70–130)
CREAT CL PREDICTED SERPL C-G-VRATE: 29 ML/MIN (ref 70–130)
CREAT CL PREDICTED SERPL C-G-VRATE: 31 ML/MIN (ref 70–130)
DRUG SCREEN CUTOFF: (no result)
DSDNA IGG SERPL IA-ACNC: 0.7 IU/ML
DSDNA INTERPRETATION: (no result)
EOSINOPHIL # BLD AUTO: 0 THOU/UL (ref 0–0.7)
EOSINOPHIL # BLD AUTO: 0.2 THOU/UL (ref 0–0.7)
EOSINOPHIL # BLD AUTO: 0.4 THOU/UL (ref 0–0.7)
EOSINOPHIL NFR BLD AUTO: 0.1 % (ref 0–10)
EOSINOPHIL NFR BLD AUTO: 2.1 % (ref 0–10)
EOSINOPHIL NFR BLD AUTO: 2.9 % (ref 0–10)
FIBRINOGEN PPP-MCNC: 398 MG/DL (ref 253–463)
GLOBULIN SER CALC-MCNC: 2.8 G/DL (ref 2.4–3.5)
GLUCOSE SERPL-MCNC: 201 MG/DL (ref 70–105)
GLUCOSE SERPL-MCNC: 232 MG/DL (ref 70–105)
GLUCOSE SERPL-MCNC: 264 MG/DL (ref 70–105)
GLUCOSE UR STRIP-MCNC: 200 MG/DL
HCO3 BLDA-SCNC: 23.7 MEQ/L (ref 22–28)
HCT VFR BLDA CALC: 23 % (ref 42–52)
HGB BLD-MCNC: 6.8 G/DL (ref 14–18)
HGB BLD-MCNC: 7.4 G/DL (ref 14–18)
HGB BLD-MCNC: 9.8 G/DL (ref 14–18)
HGB BLDA-MCNC: 7.8 G/DL (ref 14–18)
INR PPP: 1.1
INR PPP: 1.2
LYMPHOCYTES # BLD: 0.8 THOU/UL (ref 1.2–3.4)
LYMPHOCYTES # BLD: 1.8 THOU/UL (ref 1.2–3.4)
LYMPHOCYTES # BLD: 4.2 THOU/UL (ref 1.2–3.4)
LYMPHOCYTES NFR BLD AUTO: 22.7 % (ref 21–51)
LYMPHOCYTES NFR BLD AUTO: 33.4 % (ref 21–51)
LYMPHOCYTES NFR BLD AUTO: 5.8 % (ref 21–51)
MCH RBC QN AUTO: 27.5 PG (ref 27–31)
MCH RBC QN AUTO: 28.2 PG (ref 27–31)
MCH RBC QN AUTO: 29 PG (ref 27–31)
MCV RBC AUTO: 83.7 FL (ref 78–98)
MCV RBC AUTO: 85.2 FL (ref 78–98)
MCV RBC AUTO: 86.8 FL (ref 78–98)
MONOCYTES # BLD AUTO: 0.7 THOU/UL (ref 0.11–0.59)
MONOCYTES # BLD AUTO: 0.8 THOU/UL (ref 0.11–0.59)
MONOCYTES # BLD AUTO: 0.9 THOU/UL (ref 0.11–0.59)
MONOCYTES NFR BLD AUTO: 6.1 % (ref 0–10)
MONOCYTES NFR BLD AUTO: 6.3 % (ref 0–10)
MONOCYTES NFR BLD AUTO: 8.9 % (ref 0–10)
NEUTROPHILS # BLD AUTO: 12.2 THOU/UL (ref 1.4–6.5)
NEUTROPHILS # BLD AUTO: 5.2 THOU/UL (ref 1.4–6.5)
NEUTROPHILS # BLD AUTO: 7.2 THOU/UL (ref 1.4–6.5)
NEUTROPHILS NFR BLD AUTO: 57.3 % (ref 42–75)
NEUTROPHILS NFR BLD AUTO: 65.7 % (ref 42–75)
NEUTROPHILS NFR BLD AUTO: 87.9 % (ref 42–75)
O2 A-A PPRESDIFF RESPIRATORY: 64.47 MMHG (ref 0–20)
PCO2 BLDA: 42.5 MMHG (ref 35–45)
PH BLDA: 7.36 [PH] (ref 7.35–7.45)
PLATELET # BLD AUTO: 160 10X3/UL (ref 130–400)
PLATELET # BLD AUTO: 178 10X3/UL (ref 130–400)
PLATELET # BLD AUTO: 213 10X3/UL (ref 130–400)
PO2 BLDA: 238.9 MMHG (ref 80–100)
POTASSIUM BLD-SCNC: 3.53 MMOL/L (ref 3.7–5.3)
POTASSIUM SERPL-SCNC: 3.5 MMOL/L (ref 3.5–5.1)
POTASSIUM SERPL-SCNC: 4.6 MMOL/L (ref 3.5–5.1)
POTASSIUM SERPL-SCNC: 4.7 MMOL/L (ref 3.5–5.1)
PROT UR STRIP.AUTO-MCNC: 300 MG/DL
PROTHROMBIN TIME: 14.5 SEC (ref 12–14.7)
PROTHROMBIN TIME: 15.6 SEC (ref 12–14.7)
RBC # BLD AUTO: 2.42 MILL/UL (ref 4.7–6.1)
RBC # BLD AUTO: 2.69 MILL/UL (ref 4.7–6.1)
RBC # BLD AUTO: 3.39 MILL/UL (ref 4.7–6.1)
SODIUM SERPL-SCNC: 135 MMOL/L (ref 136–145)
SODIUM SERPL-SCNC: 139 MMOL/L (ref 136–145)
SODIUM SERPL-SCNC: 140 MMOL/L (ref 136–145)
SP GR UR STRIP: 1.01 (ref 1–1.04)
SPECIMEN DRAWN FROM PATIENT: (no result)
SPERM UR QL AUTO: (no result) HPF
TROPONIN I SERPL DL<=0.01 NG/ML-MCNC: (no result) NG/ML (ref ?–0.03)
WBC # BLD AUTO: 12.6 10X3/UL (ref 4.8–10.8)
WBC # BLD AUTO: 13.9 10X3/UL (ref 4.8–10.8)
WBC # BLD AUTO: 7.9 10X3/UL (ref 4.8–10.8)

## 2023-03-09 PROCEDURE — 5A12012 PERFORMANCE OF CARDIAC OUTPUT, SINGLE, MANUAL: ICD-10-PCS | Performed by: INTERNAL MEDICINE

## 2023-03-09 PROCEDURE — 30233N1 TRANSFUSION OF NONAUTOLOGOUS RED BLOOD CELLS INTO PERIPHERAL VEIN, PERCUTANEOUS APPROACH: ICD-10-PCS | Performed by: INTERNAL MEDICINE

## 2023-03-09 PROCEDURE — 5A1955Z RESPIRATORY VENTILATION, GREATER THAN 96 CONSECUTIVE HOURS: ICD-10-PCS | Performed by: INTERNAL MEDICINE

## 2023-03-09 PROCEDURE — 4A133R1 MONITORING OF ARTERIAL SATURATION, PERIPHERAL, PERCUTANEOUS APPROACH: ICD-10-PCS | Performed by: INTERNAL MEDICINE

## 2023-03-09 PROCEDURE — 0BH17EZ INSERTION OF ENDOTRACHEAL AIRWAY INTO TRACHEA, VIA NATURAL OR ARTIFICIAL OPENING: ICD-10-PCS | Performed by: INTERNAL MEDICINE

## 2023-03-09 RX ADMIN — SODIUM BICARBONATE SCH MG: 325 TABLET ORAL at 10:32

## 2023-03-09 RX ADMIN — CEFTRIAXONE SCH MLS: 1 INJECTION, POWDER, FOR SOLUTION INTRAMUSCULAR; INTRAVENOUS at 20:49

## 2023-03-09 RX ADMIN — INSULIN LISPRO PRN UNIT: 100 INJECTION, SOLUTION INTRAVENOUS; SUBCUTANEOUS at 10:53

## 2023-03-09 RX ADMIN — SODIUM BICARBONATE SCH MG: 325 TABLET ORAL at 20:49

## 2023-03-09 RX ADMIN — INSULIN LISPRO PRN UNIT: 100 INJECTION, SOLUTION INTRAVENOUS; SUBCUTANEOUS at 17:46

## 2023-03-09 RX ADMIN — SODIUM BICARBONATE SCH MG: 325 TABLET ORAL at 14:08

## 2023-03-10 LAB
ALBUMIN SERPL BCG-MCNC: 3.7 G/DL (ref 3.5–5)
ALBUMIN SERPL ELPH-MCNC: 2.7 G/DL (ref 2.9–4.4)
ALBUMIN/GLOB SERPL: 0.9 {RATIO} (ref 0.7–1.7)
ALPHA1 GLOB SERPL ELPH-MCNC: 0.2 G/DL (ref 0–0.4)
ALPHA2 GLOB SERPL ELPH-MCNC: 0.8 G/DL (ref 0.4–1)
ANALYZER IN CARDIO: (no result)
ANION GAP SERPL CALC-SCNC: 17 MMOL/L (ref 10–20)
B-GLOBULIN SERPL ELPH-MCNC: 0.9 G/DL (ref 0.7–1.3)
BASE EXCESS STD BLDA CALC-SCNC: -2.4 MEQ/L
BASOPHILS # BLD AUTO: 0 THOU/UL (ref 0–0.2)
BASOPHILS NFR BLD AUTO: 0.3 % (ref 0–1)
BUN SERPL-MCNC: 62 MG/DL (ref 8.9–20.6)
BUN/CREAT SERPL: 16.89
CA-I BLDA-SCNC: 1.13 MMOL/L (ref 1.12–1.3)
CALCIUM SERPL-MCNC: 9 MG/DL (ref 7.8–10.44)
CHLORIDE SERPL-SCNC: 108 MMOL/L (ref 98–107)
CO2 SERPL-SCNC: 21 MMOL/L (ref 22–29)
CREAT CL PREDICTED SERPL C-G-VRATE: 30 ML/MIN (ref 70–130)
EOSINOPHIL # BLD AUTO: 0.1 THOU/UL (ref 0–0.7)
EOSINOPHIL NFR BLD AUTO: 0.9 % (ref 0–10)
GAMMA GLOB SERPL ELPH-MCNC: 1 G/DL (ref 0.4–1.8)
GLOBULIN SER CALC-MCNC: 2.9 G/DL (ref 2.2–3.9)
GLUCOSE SERPL-MCNC: 124 MG/DL (ref 70–105)
HCO3 BLDA-SCNC: 21.9 MEQ/L (ref 22–28)
HCT VFR BLDA CALC: 39 % (ref 42–52)
HGB BLD-MCNC: 11.3 G/DL (ref 14–18)
HGB BLDA-MCNC: 13.1 G/DL (ref 14–18)
LYMPHOCYTES # BLD: 1.4 THOU/UL (ref 1.2–3.4)
LYMPHOCYTES NFR BLD AUTO: 10.5 % (ref 21–51)
MCH RBC QN AUTO: 29.2 PG (ref 27–31)
MCV RBC AUTO: 88.3 FL (ref 78–98)
MONOCYTES # BLD AUTO: 1.6 THOU/UL (ref 0.11–0.59)
MONOCYTES NFR BLD AUTO: 12.1 % (ref 0–10)
NEUTROPHILS # BLD AUTO: 10 THOU/UL (ref 1.4–6.5)
NEUTROPHILS NFR BLD AUTO: 76.2 % (ref 42–75)
O2 A-A PPRESDIFF RESPIRATORY: 106.78 MMHG (ref 0–20)
PCO2 BLDA: 36.1 MMHG (ref 35–45)
PH BLDA: 7.4 [PH] (ref 7.35–7.45)
PLATELET # BLD AUTO: 180 10X3/UL (ref 130–400)
PO2 BLDA: 133.3 MMHG (ref 80–100)
POTASSIUM BLD-SCNC: 4.21 MMOL/L (ref 3.7–5.3)
POTASSIUM SERPL-SCNC: 4.4 MMOL/L (ref 3.5–5.1)
RBC # BLD AUTO: 3.88 MILL/UL (ref 4.7–6.1)
SODIUM SERPL-SCNC: 142 MMOL/L (ref 136–145)
SPECIMEN DRAWN FROM PATIENT: (no result)
WBC # BLD AUTO: 13.2 10X3/UL (ref 4.8–10.8)

## 2023-03-10 RX ADMIN — SODIUM BICARBONATE SCH MG: 325 TABLET ORAL at 21:22

## 2023-03-10 RX ADMIN — CEFTRIAXONE SCH MLS: 1 INJECTION, POWDER, FOR SOLUTION INTRAMUSCULAR; INTRAVENOUS at 20:06

## 2023-03-10 RX ADMIN — SODIUM BICARBONATE SCH MG: 325 TABLET ORAL at 15:30

## 2023-03-10 RX ADMIN — SODIUM BICARBONATE SCH MG: 325 TABLET ORAL at 08:12

## 2023-03-11 LAB
ANION GAP SERPL CALC-SCNC: 20 MMOL/L (ref 10–20)
BASOPHILS # BLD AUTO: 0 THOU/UL (ref 0–0.2)
BASOPHILS NFR BLD AUTO: 0.4 % (ref 0–1)
BUN SERPL-MCNC: 55 MG/DL (ref 8.9–20.6)
CALCIUM SERPL-MCNC: 8.3 MG/DL (ref 7.8–10.44)
CHLORIDE SERPL-SCNC: 108 MMOL/L (ref 98–107)
CO2 SERPL-SCNC: 19 MMOL/L (ref 22–29)
CREAT CL PREDICTED SERPL C-G-VRATE: 34 ML/MIN (ref 70–130)
EOSINOPHIL # BLD AUTO: 0.5 THOU/UL (ref 0–0.7)
EOSINOPHIL NFR BLD AUTO: 3.6 % (ref 0–10)
GLUCOSE SERPL-MCNC: 138 MG/DL (ref 70–105)
HGB BLD-MCNC: 11.4 G/DL (ref 14–18)
LYMPHOCYTES # BLD: 1.9 THOU/UL (ref 1.2–3.4)
LYMPHOCYTES NFR BLD AUTO: 14.8 % (ref 21–51)
MCH RBC QN AUTO: 29.2 PG (ref 27–31)
MCV RBC AUTO: 89.1 FL (ref 78–98)
MONOCYTES # BLD AUTO: 1.2 THOU/UL (ref 0.11–0.59)
MONOCYTES NFR BLD AUTO: 9.5 % (ref 0–10)
NEUTROPHILS # BLD AUTO: 9.1 THOU/UL (ref 1.4–6.5)
NEUTROPHILS NFR BLD AUTO: 71.7 % (ref 42–75)
PLATELET # BLD AUTO: 188 10X3/UL (ref 130–400)
POTASSIUM SERPL-SCNC: 3.9 MMOL/L (ref 3.5–5.1)
RBC # BLD AUTO: 3.9 MILL/UL (ref 4.7–6.1)
SODIUM SERPL-SCNC: 143 MMOL/L (ref 136–145)
WBC # BLD AUTO: 12.6 10X3/UL (ref 4.8–10.8)

## 2023-03-11 RX ADMIN — SODIUM BICARBONATE SCH MG: 325 TABLET ORAL at 10:05

## 2023-03-11 RX ADMIN — SODIUM BICARBONATE SCH MG: 325 TABLET ORAL at 21:28

## 2023-03-11 RX ADMIN — HEPARIN SODIUM SCH UNITS: 5000 INJECTION, SOLUTION INTRAVENOUS; SUBCUTANEOUS at 21:28

## 2023-03-11 RX ADMIN — SODIUM BICARBONATE SCH MG: 325 TABLET ORAL at 15:16

## 2023-03-11 RX ADMIN — HEPARIN SODIUM SCH UNITS: 5000 INJECTION, SOLUTION INTRAVENOUS; SUBCUTANEOUS at 10:05

## 2023-03-11 RX ADMIN — INSULIN LISPRO PRN UNIT: 100 INJECTION, SOLUTION INTRAVENOUS; SUBCUTANEOUS at 17:21

## 2023-03-11 RX ADMIN — HEPARIN SODIUM SCH UNITS: 5000 INJECTION, SOLUTION INTRAVENOUS; SUBCUTANEOUS at 15:16

## 2023-03-11 RX ADMIN — INSULIN LISPRO PRN UNIT: 100 INJECTION, SOLUTION INTRAVENOUS; SUBCUTANEOUS at 23:39

## 2023-03-12 LAB
ANION GAP SERPL CALC-SCNC: 19 MMOL/L (ref 10–20)
BASOPHILS # BLD AUTO: 0.1 THOU/UL (ref 0–0.2)
BASOPHILS NFR BLD AUTO: 0.4 % (ref 0–1)
BUN SERPL-MCNC: 54 MG/DL (ref 8.9–20.6)
CALCIUM SERPL-MCNC: 8.1 MG/DL (ref 7.8–10.44)
CHLORIDE SERPL-SCNC: 106 MMOL/L (ref 98–107)
CO2 SERPL-SCNC: 24 MMOL/L (ref 22–29)
CREAT CL PREDICTED SERPL C-G-VRATE: 32 ML/MIN (ref 70–130)
EOSINOPHIL # BLD AUTO: 0.1 THOU/UL (ref 0–0.7)
EOSINOPHIL NFR BLD AUTO: 0.7 % (ref 0–10)
GLUCOSE SERPL-MCNC: 165 MG/DL (ref 70–105)
HGB BLD-MCNC: 11.8 G/DL (ref 14–18)
LYMPHOCYTES # BLD: 2.2 THOU/UL (ref 1.2–3.4)
LYMPHOCYTES NFR BLD AUTO: 13.2 % (ref 21–51)
MCH RBC QN AUTO: 28.6 PG (ref 27–31)
MCV RBC AUTO: 89.2 FL (ref 78–98)
MONOCYTES # BLD AUTO: 1.7 THOU/UL (ref 0.11–0.59)
MONOCYTES NFR BLD AUTO: 9.9 % (ref 0–10)
NEUTROPHILS # BLD AUTO: 12.9 THOU/UL (ref 1.4–6.5)
NEUTROPHILS NFR BLD AUTO: 75.8 % (ref 42–75)
PLATELET # BLD AUTO: 254 10X3/UL (ref 130–400)
POTASSIUM SERPL-SCNC: 3.8 MMOL/L (ref 3.5–5.1)
RBC # BLD AUTO: 4.13 MILL/UL (ref 4.7–6.1)
SODIUM SERPL-SCNC: 145 MMOL/L (ref 136–145)
WBC # BLD AUTO: 17 10X3/UL (ref 4.8–10.8)

## 2023-03-12 RX ADMIN — HEPARIN SODIUM SCH UNITS: 5000 INJECTION, SOLUTION INTRAVENOUS; SUBCUTANEOUS at 08:06

## 2023-03-12 RX ADMIN — INSULIN LISPRO PRN UNIT: 100 INJECTION, SOLUTION INTRAVENOUS; SUBCUTANEOUS at 21:32

## 2023-03-12 RX ADMIN — SODIUM BICARBONATE SCH MG: 325 TABLET ORAL at 15:21

## 2023-03-12 RX ADMIN — SODIUM BICARBONATE SCH MG: 325 TABLET ORAL at 21:25

## 2023-03-12 RX ADMIN — INSULIN LISPRO PRN UNIT: 100 INJECTION, SOLUTION INTRAVENOUS; SUBCUTANEOUS at 15:40

## 2023-03-12 RX ADMIN — INSULIN LISPRO PRN UNIT: 100 INJECTION, SOLUTION INTRAVENOUS; SUBCUTANEOUS at 08:08

## 2023-03-12 RX ADMIN — HEPARIN SODIUM SCH UNITS: 5000 INJECTION, SOLUTION INTRAVENOUS; SUBCUTANEOUS at 15:20

## 2023-03-12 RX ADMIN — HEPARIN SODIUM SCH UNITS: 5000 INJECTION, SOLUTION INTRAVENOUS; SUBCUTANEOUS at 21:22

## 2023-03-12 RX ADMIN — SODIUM BICARBONATE SCH MG: 325 TABLET ORAL at 08:11

## 2023-03-12 RX ADMIN — INSULIN LISPRO PRN UNIT: 100 INJECTION, SOLUTION INTRAVENOUS; SUBCUTANEOUS at 03:08

## 2023-03-13 VITALS — DIASTOLIC BLOOD PRESSURE: 86 MMHG | SYSTOLIC BLOOD PRESSURE: 164 MMHG

## 2023-03-13 VITALS — TEMPERATURE: 100 F

## 2023-03-13 LAB
ALBUMIN MFR UR ELPH: 52.3 %
ALPHA1 GLOB MFR UR ELPH: 2.8 %
ALPHA2 GLOB 24H MFR UR ELPH: 11.7 %
ANALYZER IN CARDIO: (no result)
ANION GAP SERPL CALC-SCNC: 20 MMOL/L (ref 10–20)
B-GLOBULIN 24H MFR UR ELPH: 14.2 %
BASE EXCESS STD BLDA CALC-SCNC: 9.2 MEQ/L
BASOPHILS # BLD AUTO: 0 THOU/UL (ref 0–0.2)
BASOPHILS NFR BLD AUTO: 0.1 % (ref 0–1)
BUN SERPL-MCNC: 50 MG/DL (ref 8.9–20.6)
CA-I BLDA-SCNC: 1.04 MMOL/L (ref 1.12–1.3)
CALCIUM SERPL-MCNC: 8.1 MG/DL (ref 7.8–10.44)
CHLORIDE SERPL-SCNC: 101 MMOL/L (ref 98–107)
CO2 SERPL-SCNC: 27 MMOL/L (ref 22–29)
CREAT CL PREDICTED SERPL C-G-VRATE: 32 ML/MIN (ref 70–130)
EOSINOPHIL # BLD AUTO: 0 THOU/UL (ref 0–0.7)
EOSINOPHIL NFR BLD AUTO: 0.3 % (ref 0–10)
GAMMA GLOB 24H MFR UR ELPH: 19.1 %
GLUCOSE SERPL-MCNC: 242 MG/DL (ref 70–105)
HCO3 BLDA-SCNC: 33.5 MEQ/L (ref 22–28)
HCT VFR BLDA CALC: 36 % (ref 42–52)
HGB BLD-MCNC: 11.7 G/DL (ref 14–18)
HGB BLDA-MCNC: 12.2 G/DL (ref 14–18)
LYMPHOCYTES # BLD: 1.8 THOU/UL (ref 1.2–3.4)
LYMPHOCYTES NFR BLD AUTO: 11 % (ref 21–51)
M PROTEIN 24H UR ELPH-MRATE: (no result) MG/(24.H)
MCH RBC QN AUTO: 27.7 PG (ref 27–31)
MCV RBC AUTO: 89.5 FL (ref 78–98)
MONOCYTES # BLD AUTO: 1.6 THOU/UL (ref 0.11–0.59)
MONOCYTES NFR BLD AUTO: 9.7 % (ref 0–10)
NEUTROPHILS # BLD AUTO: 12.8 THOU/UL (ref 1.4–6.5)
NEUTROPHILS NFR BLD AUTO: 79 % (ref 42–75)
O2 A-A PPRESDIFF RESPIRATORY: 151.72 MMHG (ref 0–20)
PCO2 BLDA: 44.3 MMHG (ref 35–45)
PH BLDA: 7.5 [PH] (ref 7.35–7.45)
PLATELET # BLD AUTO: 252 10X3/UL (ref 130–400)
PO2 BLDA: 78.1 MMHG (ref 80–100)
POTASSIUM BLD-SCNC: 3.63 MMOL/L (ref 3.7–5.3)
POTASSIUM SERPL-SCNC: 3.8 MMOL/L (ref 3.5–5.1)
PROT 24H UR-MRATE: 2910 MG/24 HR (ref 30–150)
PROT UR-MCNC: 166.3 MG/DL
RBC # BLD AUTO: 4.23 MILL/UL (ref 4.7–6.1)
SODIUM SERPL-SCNC: 144 MMOL/L (ref 136–145)
SPECIMEN DRAWN FROM PATIENT: (no result)
WBC # BLD AUTO: 16.2 10X3/UL (ref 4.8–10.8)

## 2023-03-13 RX ADMIN — INSULIN LISPRO PRN UNIT: 100 INJECTION, SOLUTION INTRAVENOUS; SUBCUTANEOUS at 16:34

## 2023-03-13 RX ADMIN — SODIUM BICARBONATE SCH MG: 325 TABLET ORAL at 14:09

## 2023-03-13 RX ADMIN — INSULIN LISPRO PRN UNIT: 100 INJECTION, SOLUTION INTRAVENOUS; SUBCUTANEOUS at 00:37

## 2023-03-13 RX ADMIN — INSULIN LISPRO PRN UNIT: 100 INJECTION, SOLUTION INTRAVENOUS; SUBCUTANEOUS at 09:40

## 2023-03-13 RX ADMIN — SODIUM BICARBONATE SCH MG: 325 TABLET ORAL at 09:06

## 2023-03-13 RX ADMIN — HEPARIN SODIUM SCH UNITS: 5000 INJECTION, SOLUTION INTRAVENOUS; SUBCUTANEOUS at 20:36

## 2023-03-13 RX ADMIN — HEPARIN SODIUM SCH UNITS: 5000 INJECTION, SOLUTION INTRAVENOUS; SUBCUTANEOUS at 14:09

## 2023-03-13 RX ADMIN — SODIUM BICARBONATE SCH MG: 325 TABLET ORAL at 20:36

## 2023-03-13 RX ADMIN — INSULIN LISPRO PRN UNIT: 100 INJECTION, SOLUTION INTRAVENOUS; SUBCUTANEOUS at 05:58

## 2023-03-13 RX ADMIN — INSULIN LISPRO PRN UNIT: 100 INJECTION, SOLUTION INTRAVENOUS; SUBCUTANEOUS at 13:12

## 2023-03-13 RX ADMIN — HEPARIN SODIUM SCH UNITS: 5000 INJECTION, SOLUTION INTRAVENOUS; SUBCUTANEOUS at 09:06
